# Patient Record
(demographics unavailable — no encounter records)

---

## 2021-11-11 NOTE — NUR
PATIENT HAS BEEN SCREENED AND CATEGORIZED AS LOW NUTRITION RISK. PATIENT WILL BE SEEN WITHIN 
7 DAYS OF ADMISSION.



11/17/21



[address]